# Patient Record
Sex: FEMALE | Race: WHITE | Employment: OTHER | URBAN - METROPOLITAN AREA
[De-identification: names, ages, dates, MRNs, and addresses within clinical notes are randomized per-mention and may not be internally consistent; named-entity substitution may affect disease eponyms.]

---

## 2024-02-21 ENCOUNTER — TELEMEDICINE (OUTPATIENT)
Age: 34
End: 2024-02-21
Payer: COMMERCIAL

## 2024-02-21 DIAGNOSIS — F41.1 GAD (GENERALIZED ANXIETY DISORDER): ICD-10-CM

## 2024-02-21 DIAGNOSIS — G40.919 INTRACTABLE EPILEPSY WITHOUT STATUS EPILEPTICUS, UNSPECIFIED EPILEPSY TYPE (HCC): Primary | ICD-10-CM

## 2024-02-21 DIAGNOSIS — G43.119 INTRACTABLE MIGRAINE WITH AURA WITHOUT STATUS MIGRAINOSUS: ICD-10-CM

## 2024-02-21 PROCEDURE — G8427 DOCREV CUR MEDS BY ELIG CLIN: HCPCS | Performed by: PSYCHIATRY & NEUROLOGY

## 2024-02-21 PROCEDURE — 99214 OFFICE O/P EST MOD 30 MIN: CPT | Performed by: PSYCHIATRY & NEUROLOGY

## 2024-02-21 PROCEDURE — G8417 CALC BMI ABV UP PARAM F/U: HCPCS | Performed by: PSYCHIATRY & NEUROLOGY

## 2024-02-21 PROCEDURE — G8484 FLU IMMUNIZE NO ADMIN: HCPCS | Performed by: PSYCHIATRY & NEUROLOGY

## 2024-02-21 PROCEDURE — 4004F PT TOBACCO SCREEN RCVD TLK: CPT | Performed by: PSYCHIATRY & NEUROLOGY

## 2024-02-21 RX ORDER — DULOXETIN HYDROCHLORIDE 60 MG/1
60 CAPSULE, DELAYED RELEASE ORAL DAILY
Qty: 30 CAPSULE | Refills: 5 | Status: SHIPPED | OUTPATIENT
Start: 2024-02-21

## 2024-02-21 RX ORDER — GABAPENTIN 300 MG/1
CAPSULE ORAL
COMMUNITY
Start: 2024-02-13 | End: 2024-03-13

## 2024-02-21 RX ORDER — OXCARBAZEPINE 600 MG/1
600 TABLET ORAL DAILY
Qty: 30 TABLET | Refills: 5 | Status: SHIPPED | OUTPATIENT
Start: 2024-02-21

## 2024-02-21 NOTE — PROGRESS NOTES
Tiana Schulte MD       Genesis Pina is a 33 y.o. female presenting as a follow patient for a   Chief Complaint   Patient presents with    Follow-up    Seizures      Gaining weight  Weighs 236 lbs  Going for gastric sleeve    Tried medication to lose weight  Did not help       Epilepsy:  1yrs old- episode of passing out for several minutes. Etiology not known.   2nd episode: 2012Feb, full term , ready to deliver 3rd baby, when she bent over and fell over, hit the bed, felt tremulousness all over, eyes had rolled back, wet herself. Woke up and was nauseated as well as mildly confused. ?syncope vs seizure.   3rd episode: April 2013, happened a few minutes after intercourse.  heard a thud and found her unresponsive in rest room, were noticed to have a seizure for 2-3 mins. No tongue biting/incontinence. No aura with this episode.   4th episode: may 4th 2014.   This happened immediately after intercourse.  heard her making funny noise, and then noticed that her eyes were rolled up and stiffened up all over and started shaking for 30 seconds.   Post ictal for 5 minutes.   Had a tongue biting on right > left side, but no urinary incontinence       Seizure type: partial seizures originating in right frontal lobe with secondary generalized seizures   Etiology: cryptogenic   Date of diagnosis: may 4th 2014     Current seizure medication: oxtellar 600 mg q daily since beginning of jan 2019      keppra- caused neutropenia, higher dose caused dizziness  Trileptal- dose increase caused dizziness.   Compliance with medication: yes   Any side effects of the medication: feels tired, foggy.    Breakthrough seizures:yes.   Date of last seizure: 1/31/2021  Missed a dose on 1/30/2021 of oxtellar xr 600 mg qhs.  Did not take any aed on that day  Also drank 4 glasses of wine     oct 29th 2018  Had 3 glasses of wine on 28th .  Has been going without sleep for last 2 months.   Had a uti.     Description:  Was

## 2024-09-11 RX ORDER — OXCARBAZEPINE 600 MG/1
600 TABLET ORAL DAILY
Qty: 30 TABLET | Refills: 0 | OUTPATIENT
Start: 2024-09-11

## 2024-09-12 ENCOUNTER — TELEMEDICINE (OUTPATIENT)
Age: 34
End: 2024-09-12
Payer: COMMERCIAL

## 2024-09-12 DIAGNOSIS — G40.919 INTRACTABLE EPILEPSY WITHOUT STATUS EPILEPTICUS, UNSPECIFIED EPILEPSY TYPE (HCC): Primary | ICD-10-CM

## 2024-09-12 DIAGNOSIS — F41.1 GAD (GENERALIZED ANXIETY DISORDER): ICD-10-CM

## 2024-09-12 DIAGNOSIS — G43.119 INTRACTABLE MIGRAINE WITH AURA WITHOUT STATUS MIGRAINOSUS: ICD-10-CM

## 2024-09-12 PROCEDURE — G8417 CALC BMI ABV UP PARAM F/U: HCPCS | Performed by: PSYCHIATRY & NEUROLOGY

## 2024-09-12 PROCEDURE — G8427 DOCREV CUR MEDS BY ELIG CLIN: HCPCS | Performed by: PSYCHIATRY & NEUROLOGY

## 2024-09-12 PROCEDURE — 99214 OFFICE O/P EST MOD 30 MIN: CPT | Performed by: PSYCHIATRY & NEUROLOGY

## 2024-09-12 PROCEDURE — 4004F PT TOBACCO SCREEN RCVD TLK: CPT | Performed by: PSYCHIATRY & NEUROLOGY

## 2024-09-12 RX ORDER — METHOCARBAMOL 500 MG/1
TABLET, FILM COATED ORAL
COMMUNITY
End: 2024-09-12

## 2024-09-12 RX ORDER — URSODIOL 300 MG/1
CAPSULE ORAL
COMMUNITY
Start: 2024-04-30

## 2024-09-12 RX ORDER — DULOXETIN HYDROCHLORIDE 60 MG/1
60 CAPSULE, DELAYED RELEASE ORAL DAILY
Qty: 90 CAPSULE | Refills: 1 | Status: SHIPPED | OUTPATIENT
Start: 2024-09-12

## 2024-09-12 RX ORDER — OMEPRAZOLE 40 MG/1
40 CAPSULE, DELAYED RELEASE ORAL DAILY
COMMUNITY
Start: 2024-05-24

## 2024-09-12 RX ORDER — PREGABALIN 50 MG/1
CAPSULE ORAL
COMMUNITY
Start: 2024-06-03 | End: 2024-09-12

## 2024-09-12 RX ORDER — OXCARBAZEPINE 600 MG/1
600 TABLET ORAL DAILY
Qty: 90 TABLET | Refills: 1 | Status: SHIPPED | OUTPATIENT
Start: 2024-09-12

## 2024-10-15 ENCOUNTER — HOSPITAL ENCOUNTER (EMERGENCY)
Age: 34
Discharge: HOME | End: 2024-10-15
Attending: EMERGENCY MEDICINE
Payer: COMMERCIAL

## 2024-10-15 ENCOUNTER — APPOINTMENT (OUTPATIENT)
Dept: CT IMAGING | Age: 34
End: 2024-10-15
Payer: COMMERCIAL

## 2024-10-15 ENCOUNTER — HOSPITAL ENCOUNTER (OUTPATIENT)
Age: 34
Discharge: HOME | End: 2024-10-15
Payer: COMMERCIAL

## 2024-10-15 VITALS
OXYGEN SATURATION: 100 % | DIASTOLIC BLOOD PRESSURE: 74 MMHG | WEIGHT: 190 LBS | HEART RATE: 70 BPM | RESPIRATION RATE: 18 BRPM | BODY MASS INDEX: 35.9 KG/M2 | TEMPERATURE: 98.4 F | SYSTOLIC BLOOD PRESSURE: 128 MMHG

## 2024-10-15 DIAGNOSIS — R56.9 SEIZURE (HCC): Primary | ICD-10-CM

## 2024-10-15 DIAGNOSIS — G40.919 INTRACTABLE EPILEPSY WITHOUT STATUS EPILEPTICUS, UNSPECIFIED EPILEPSY TYPE (HCC): ICD-10-CM

## 2024-10-15 PROCEDURE — 99284 EMERGENCY DEPT VISIT MOD MDM: CPT

## 2024-10-15 PROCEDURE — 70450 CT HEAD/BRAIN W/O DYE: CPT

## 2024-10-15 PROCEDURE — 6360000002 HC RX W HCPCS

## 2024-10-15 PROCEDURE — 96374 THER/PROPH/DIAG INJ IV PUSH: CPT

## 2024-10-15 PROCEDURE — 80183 DRUG SCRN QUANT OXCARBAZEPIN: CPT

## 2024-10-15 PROCEDURE — 72125 CT NECK SPINE W/O DYE: CPT

## 2024-10-15 PROCEDURE — 36415 COLL VENOUS BLD VENIPUNCTURE: CPT

## 2024-10-15 PROCEDURE — 96375 TX/PRO/DX INJ NEW DRUG ADDON: CPT

## 2024-10-15 RX ORDER — ONDANSETRON 2 MG/ML
4 INJECTION INTRAMUSCULAR; INTRAVENOUS ONCE
Status: COMPLETED | OUTPATIENT
Start: 2024-10-15 | End: 2024-10-15

## 2024-10-15 RX ORDER — KETOROLAC TROMETHAMINE 15 MG/ML
15 INJECTION, SOLUTION INTRAMUSCULAR; INTRAVENOUS ONCE
Status: COMPLETED | OUTPATIENT
Start: 2024-10-15 | End: 2024-10-15

## 2024-10-15 RX ORDER — DIPHENHYDRAMINE HYDROCHLORIDE 50 MG/ML
25 INJECTION INTRAMUSCULAR; INTRAVENOUS ONCE
Status: DISCONTINUED | OUTPATIENT
Start: 2024-10-15 | End: 2024-10-15

## 2024-10-15 RX ADMIN — ONDANSETRON 4 MG: 2 INJECTION, SOLUTION INTRAMUSCULAR; INTRAVENOUS at 15:33

## 2024-10-15 RX ADMIN — KETOROLAC TROMETHAMINE 15 MG: 15 INJECTION, SOLUTION INTRAMUSCULAR; INTRAVENOUS at 15:37

## 2024-10-15 ASSESSMENT — LIFESTYLE VARIABLES
HOW OFTEN DO YOU HAVE A DRINK CONTAINING ALCOHOL: MONTHLY OR LESS
HOW MANY STANDARD DRINKS CONTAINING ALCOHOL DO YOU HAVE ON A TYPICAL DAY: 1 OR 2

## 2024-10-15 ASSESSMENT — PAIN SCALES - GENERAL: PAINLEVEL_OUTOF10: 7

## 2024-10-15 ASSESSMENT — PAIN DESCRIPTION - LOCATION: LOCATION: HEAD

## 2024-10-15 ASSESSMENT — PAIN DESCRIPTION - DESCRIPTORS: DESCRIPTORS: ACHING;HEAVINESS

## 2024-10-15 NOTE — DISCHARGE INSTRUCTIONS
Please return to the ER for any new or worsening symptoms including but not limited to Fever or Chest pain or difficulty breathing  If prescribed, please be sure to  your prescriptions from the pharmacy  Please follow-up with Primary care provider and neurology as instructed

## 2024-10-15 NOTE — ED PROVIDER NOTES
[facility] EMERGENCY DEPARTMENT  EMERGENCY DEPARTMENT ENCOUNTER        Pt Name: Genesis Pina  MRN: [id number]  Birthdate 1990  Date of evaluation: 10/15/2024  Provider: Lulu Guajardo DO  PCP: Joselito Stanton DO  Note Started: 2:12 PM EDT 10/15/24    CHIEF COMPLAINT       Chief Complaint   Patient presents with    Seizures     Pt complaint of seizure with hx of seizures. Pt out of seizure medications for 2 days.       HISTORY OF PRESENT ILLNESS: 1 or more Elements   History From: Patient    Limitations to history : None  Social Determinants : None    Genesis Pina is a 34 y.o. female who presents for seizure. She states she had a seizure this morning around 11:30. She has a hx of seizures and did not have her medication for the past 2 days. She states the pharmacy was out of the medication until today. She states she has a hx of grand mal seizures and has not had a seizure since she missed a dose of this medicine a few months ago. She did hit her head when she fell and seized today. She states she has a headache and some nausea which is normal for her after a seizure.  Denies any fever, chills, dizziness, vision changes, neck tenderness or stiffness, weakness, cp, palpitations, leg swelling/tenderness, sob, cough, abd pain, dysuria, hematuria, diarrhea, constipation, bloody stools.    Nursing Notes were all reviewed and agreed with or any disagreements were addressed in the HPI.    ROS:   Pertinent positives and negatives are stated within HPI, all other systems reviewed and are negative.      --------------------------------------------- PAST HISTORY ---------------------------------------------  Past Medical History:  has a past medical history of Epilepsy (HCC) and Seizures (HCC).    Past Surgical History:  has a past surgical history that includes  section; Tubal ligation; and Hysterectomy.    Social History:  reports that she has quit smoking. Her  16   Wt 86.2 kg (190 lb)   LMP 11/17/2016   SpO2 100%   BMI 35.90 kg/m²   While in the ED patient was hemodynamically stable, afebrile, nontoxic-appearing, in no respiratory distress.   Physical exam remarkable for normal physical exam, no neuro deficits  Ddx: seizure, intracranial hemorrhage, skull fracture, cervical spine fracture   CT head showed no acute intracranial abnormality  CT cervical spine showed no acute abnormality    Patient administered Toradol and Zofran.  Patient's headache improved on reevaluation.  The fact that we did not have the patient's carbamazepine extended release in our pharmacy, and patient said that her pharmacy had it ready, be discharged to  and take her medication tonight.  Due to patient's reassuring physical exam including no CT imagings, patiently discharged home for outpatient management and follow-up. She is educated on signs and symptoms that require emergent evaluation. Pt is advised to return to the ED if her symptoms change or worsen. If her pain persists, pt may need further evaluation. Pt is agreeable to plan and all questions have been answered at this time.      Please see ED course for more details:         Medications   ketorolac (TORADOL) injection 15 mg (15 mg IntraVENous Given 10/15/24 1537)   ondansetron (ZOFRAN) injection 4 mg (4 mg IntraVENous Given 10/15/24 1533)       Discharge Medication List as of 10/15/2024  3:49 PM            Counseling:   The emergency provider has spoken with the patient and discussed today’s results, in addition to providing specific details for the plan of care and counseling regarding the diagnosis and prognosis.  Questions are answered at this time and they are agreeable with the plan.       --------------------------------- IMPRESSION AND DISPOSITION ---------------------------------    IMPRESSION  1. Seizure (HCC)        DISPOSITION  Disposition: Discharge to home  Patient condition is stable        NOTE: This report was

## 2024-10-16 ENCOUNTER — TELEMEDICINE (OUTPATIENT)
Age: 34
End: 2024-10-16
Payer: COMMERCIAL

## 2024-10-16 DIAGNOSIS — G43.901 STATUS MIGRAINOSUS: ICD-10-CM

## 2024-10-16 DIAGNOSIS — G43.119 INTRACTABLE MIGRAINE WITH AURA WITHOUT STATUS MIGRAINOSUS: ICD-10-CM

## 2024-10-16 DIAGNOSIS — G40.919 INTRACTABLE EPILEPSY WITHOUT STATUS EPILEPTICUS, UNSPECIFIED EPILEPSY TYPE (HCC): Primary | ICD-10-CM

## 2024-10-16 DIAGNOSIS — F41.1 GAD (GENERALIZED ANXIETY DISORDER): ICD-10-CM

## 2024-10-16 PROCEDURE — 99214 OFFICE O/P EST MOD 30 MIN: CPT | Performed by: PSYCHIATRY & NEUROLOGY

## 2024-10-16 PROCEDURE — G8427 DOCREV CUR MEDS BY ELIG CLIN: HCPCS | Performed by: PSYCHIATRY & NEUROLOGY

## 2024-10-16 PROCEDURE — 4004F PT TOBACCO SCREEN RCVD TLK: CPT | Performed by: PSYCHIATRY & NEUROLOGY

## 2024-10-16 PROCEDURE — G8484 FLU IMMUNIZE NO ADMIN: HCPCS | Performed by: PSYCHIATRY & NEUROLOGY

## 2024-10-16 PROCEDURE — G8417 CALC BMI ABV UP PARAM F/U: HCPCS | Performed by: PSYCHIATRY & NEUROLOGY

## 2024-10-16 RX ORDER — METRONIDAZOLE 500 MG/1
TABLET ORAL
COMMUNITY
Start: 2024-09-19

## 2024-10-16 RX ORDER — MELOXICAM 15 MG/1
15 TABLET ORAL DAILY
COMMUNITY
Start: 2024-09-26

## 2024-10-16 RX ORDER — OXCARBAZEPINE 300 MG/1
300 TABLET, FILM COATED ORAL 2 TIMES DAILY PRN
Qty: 28 TABLET | Refills: 0 | Status: SHIPPED | OUTPATIENT
Start: 2024-10-16 | End: 2024-10-30

## 2024-10-16 RX ORDER — METHYLPREDNISOLONE 4 MG
TABLET, DOSE PACK ORAL
Qty: 22 TABLET | Refills: 0 | Status: SHIPPED | OUTPATIENT
Start: 2024-10-16 | End: 2024-10-22

## 2024-10-16 NOTE — PROGRESS NOTES
time      2. Status migrainosus  Has a status migrainosus post COVID and after a breakthrough seizure  Advised her to have a Medrol Dosepak  Continue on Cymbalta 60 mg daily    3. Intractable migraine with aura without status migrainosus  Her headaches have been controlled on Cymbalta 60 mg daily  But recent COVID infection and a possible breakthrough seizure echo is causing the status migrainosus since yesterday  Has been using NSAIDs over-the-counter  Advised her to use Medrol Dosepak      4. PARI (generalized anxiety disorder)  On Cymbalta        Tiana Schulte, MD Genesis Pina, was evaluated through a synchronous (real-time) audio-video encounter. The patient (and/or guardian if applicable) is aware that this is a billable service, which includes applicable co-pays. This virtual visit was conducted with patient's (and/or legal guardian's) consent. Patient identification was verified, and a caregiver was present when appropriate.  The patient was located at Home: [address] [place] [address]  The provider was located at Home (City/State): [place]  Yes, I confirm.   Consults     Total time spent on this encounter: Not billed by time    An electronic signature was used to authenticate this note.

## 2024-10-19 LAB — 10OH-CARBAZEPINE SERPL-MCNC: <1 UG/ML (ref 3–35)

## 2025-02-25 RX ORDER — OXCARBAZEPINE 600 MG/1
600 TABLET, EXTENDED RELEASE ORAL DAILY
Qty: 90 TABLET | Refills: 0 | Status: SHIPPED | OUTPATIENT
Start: 2025-02-25

## 2025-02-25 NOTE — TELEPHONE ENCOUNTER
The following medication has been approved and sent to your pharmacy    Requested Prescriptions     Signed Prescriptions Disp Refills    OXcarbazepine ER (OXTELLAR XR) 600 MG TB24 90 tablet 0     Sig: Take 600 mg by mouth daily     Authorizing Provider: CARMELLA MARS

## 2025-03-17 ENCOUNTER — OFFICE VISIT (OUTPATIENT)
Age: 35
End: 2025-03-17
Payer: COMMERCIAL

## 2025-03-17 VITALS
DIASTOLIC BLOOD PRESSURE: 77 MMHG | HEIGHT: 61 IN | SYSTOLIC BLOOD PRESSURE: 114 MMHG | HEART RATE: 77 BPM | BODY MASS INDEX: 32 KG/M2 | WEIGHT: 169.5 LBS

## 2025-03-17 DIAGNOSIS — G43.119 INTRACTABLE MIGRAINE WITH AURA WITHOUT STATUS MIGRAINOSUS: ICD-10-CM

## 2025-03-17 DIAGNOSIS — R53.82 CHRONIC FATIGUE: ICD-10-CM

## 2025-03-17 DIAGNOSIS — G40.919 INTRACTABLE EPILEPSY WITHOUT STATUS EPILEPTICUS, UNSPECIFIED EPILEPSY TYPE (HCC): Primary | ICD-10-CM

## 2025-03-17 DIAGNOSIS — Z98.84 HISTORY OF BARIATRIC SURGERY: ICD-10-CM

## 2025-03-17 DIAGNOSIS — F41.1 GAD (GENERALIZED ANXIETY DISORDER): ICD-10-CM

## 2025-03-17 LAB
FOLATE: >20 NG/ML (ref 4.8–24.2)
VITAMIN B-12: 1229 PG/ML (ref 211–946)

## 2025-03-17 PROCEDURE — 99214 OFFICE O/P EST MOD 30 MIN: CPT | Performed by: PSYCHIATRY & NEUROLOGY

## 2025-03-17 PROCEDURE — G8417 CALC BMI ABV UP PARAM F/U: HCPCS | Performed by: PSYCHIATRY & NEUROLOGY

## 2025-03-17 PROCEDURE — G8427 DOCREV CUR MEDS BY ELIG CLIN: HCPCS | Performed by: PSYCHIATRY & NEUROLOGY

## 2025-03-17 PROCEDURE — 4004F PT TOBACCO SCREEN RCVD TLK: CPT | Performed by: PSYCHIATRY & NEUROLOGY

## 2025-03-17 RX ORDER — DULOXETIN HYDROCHLORIDE 30 MG/1
30 CAPSULE, DELAYED RELEASE ORAL DAILY
Qty: 90 CAPSULE | Refills: 1 | Status: SHIPPED | OUTPATIENT
Start: 2025-03-17

## 2025-03-17 RX ORDER — OXCARBAZEPINE 600 MG/1
600 TABLET, EXTENDED RELEASE ORAL DAILY
Qty: 90 TABLET | Refills: 1 | Status: SHIPPED | OUTPATIENT
Start: 2025-03-17

## 2025-03-17 RX ORDER — DULOXETIN HYDROCHLORIDE 60 MG/1
60 CAPSULE, DELAYED RELEASE ORAL DAILY
Qty: 90 CAPSULE | Refills: 1 | Status: SHIPPED | OUTPATIENT
Start: 2025-03-17

## 2025-03-17 RX ORDER — HYDROXYZINE PAMOATE 25 MG/1
25 CAPSULE ORAL 3 TIMES DAILY PRN
Qty: 90 CAPSULE | Refills: 5 | Status: SHIPPED | OUTPATIENT
Start: 2025-03-17

## 2025-03-17 NOTE — PROGRESS NOTES
lateral border of hand. . No extinction on double simultaneous stimulation..   Therewas no extinction on the bilateral with bilateral simultaneous stimulus.   The gait examination was normal including tandem gait.   alexander hallpike's neg   Skin: Skin is warm. She is not diaphoretic.   Psychiatric: Memory, affect and judgment normal.      Iron levels ok  Ferritin nml  Tsh nml  Cbc: low wbc count  Cmp: nml  Lipid panel: ldl 180        ASSESSMENT AND PLAN   1. Intractable epilepsy without status epilepticus, unspecified epilepsy type (HCC)  Patient seizures have been controlled on Oxtellar 600 mg daily   Continue Oxtellar 600 mg daily  She does have a short supply of generic Trileptal just in case her pharmacy runs out of Oxtellar like they did in October 2024    - OXcarbazepine ER (OXTELLAR XR) 600 MG TB24; Take 600 mg by mouth daily  Dispense: 90 tablet; Refill: 1    2. Intractable migraine with aura without status migrainosus  Headaches have been well-controlled on Cymbalta 60 mg daily    3. PARI (generalized anxiety disorder)  Anxiety has been a little bit more pronounced  Advised her to increase her Cymbalta to 90 mg daily  BuSpar did not help  Help suggest Vistaril as needed    4. Chronic fatigue  Had bariatric surgery  Having weight loss  But complains of fatigue  On bariatric once a day vitamin  Her iron levels and ferritin levels were normal  Recommend checking B12, folic acid B1 and copper level    - Vitamin B12 & Folate; Future  - Vitamin B1; Future  - Copper, Serum; Future    5. History of bariatric surgery  Advised her to be on extra B12, copper as well as iron supplement          Tiana Schulte MD

## 2025-03-17 NOTE — PATIENT INSTRUCTIONS
Take b12- 500 to 1000 mcg daily    Copper 2 mg daily      Iron elemental iron take 65 mg daily      Tiana Schulte MD

## 2025-03-20 LAB — COPPER: 88.1 UG/DL (ref 80–155)

## 2025-03-23 LAB — VITAMIN B1 WHOLE BLOOD: 175 NMOL/L (ref 70–180)

## 2025-04-04 ENCOUNTER — RESULTS FOLLOW-UP (OUTPATIENT)
Age: 35
End: 2025-04-04

## 2025-06-02 ENCOUNTER — HOSPITAL ENCOUNTER (EMERGENCY)
Facility: HOSPITAL | Age: 35
Discharge: HOME | End: 2025-06-02
Attending: EMERGENCY MEDICINE
Payer: COMMERCIAL

## 2025-06-02 VITALS
BODY MASS INDEX: 31.72 KG/M2 | HEART RATE: 94 BPM | TEMPERATURE: 97.3 F | WEIGHT: 168 LBS | OXYGEN SATURATION: 100 % | DIASTOLIC BLOOD PRESSURE: 95 MMHG | RESPIRATION RATE: 16 BRPM | HEIGHT: 61 IN | SYSTOLIC BLOOD PRESSURE: 135 MMHG

## 2025-06-02 DIAGNOSIS — R21 RASH AND OTHER NONSPECIFIC SKIN ERUPTION: Primary | ICD-10-CM

## 2025-06-02 PROCEDURE — 99283 EMERGENCY DEPT VISIT LOW MDM: CPT | Performed by: EMERGENCY MEDICINE

## 2025-06-02 RX ORDER — HYDROXYZINE HYDROCHLORIDE 25 MG/1
25 TABLET, FILM COATED ORAL EVERY 6 HOURS
Qty: 12 TABLET | Refills: 0 | Status: SHIPPED | OUTPATIENT
Start: 2025-06-02 | End: 2025-06-05

## 2025-06-02 ASSESSMENT — COLUMBIA-SUICIDE SEVERITY RATING SCALE - C-SSRS
6. HAVE YOU EVER DONE ANYTHING, STARTED TO DO ANYTHING, OR PREPARED TO DO ANYTHING TO END YOUR LIFE?: NO
2. HAVE YOU ACTUALLY HAD ANY THOUGHTS OF KILLING YOURSELF?: NO
1. IN THE PAST MONTH, HAVE YOU WISHED YOU WERE DEAD OR WISHED YOU COULD GO TO SLEEP AND NOT WAKE UP?: NO

## 2025-06-02 ASSESSMENT — PAIN DESCRIPTION - DESCRIPTORS: DESCRIPTORS: ACHING

## 2025-06-02 ASSESSMENT — PAIN - FUNCTIONAL ASSESSMENT
PAIN_FUNCTIONAL_ASSESSMENT: 0-10
PAIN_FUNCTIONAL_ASSESSMENT: 0-10

## 2025-06-02 ASSESSMENT — PAIN DESCRIPTION - LOCATION: LOCATION: EYE

## 2025-06-02 ASSESSMENT — PAIN SCALES - GENERAL
PAINLEVEL_OUTOF10: 3
PAINLEVEL_OUTOF10: 0 - NO PAIN

## 2025-06-02 ASSESSMENT — PAIN DESCRIPTION - PAIN TYPE: TYPE: ACUTE PAIN

## 2025-06-03 NOTE — ED PROVIDER NOTES
[facility]  ED  Provider Note  No admission date for patient encounter.  Room/bed info not found              No chief complaint on file.        History of Present Illness:   Ashia Juares is a 34 y.o. female presenting to the ED for rash, beginning more than 10 days ago.  The complaint has been persistent, moderate in severity, and worsened by itching patient has a rash that started in the back of her left knee throughout the left thigh with some spots on her buttock.  Left arm and now on her left face.  She has been treated for lice with Nix lotion, high-dose prednisone for possible poison ivy, and acyclovir for possible shingles.  She continues to be symptomatic.  She denies any known exposures to poison ivy poison oak or other skin irritants.  She denies any change in laundry care products skin care products soaps or shampoos.  She is not short of breath.  She has no cough.      Review of Systems:   Pertinent positives and review of systems as noted above.  Remaining 10 review of systems is negative or noncontributory to today's episode of care.  Review of Systems       --------------------------------------------- PAST HISTORY ---------------------------------------------  Medical History[1]      has no past surgical history on file.         family history is not on file. Unless otherwise noted, family history is non contributory    Patient's Medications    No medications on file      The patient’s home medications have been reviewed.    Allergies: Iodinated contrast media    -------------------------------------------------- RESULTS -------------------------------------------------  All laboratory and radiology results have been personally reviewed by myself   LABS:  Labs Reviewed - No data to display    EKG:     RADIOLOGY:  Interpreted by Radiologist.  No orders to display       ------------------------- NURSING NOTES AND VITALS REVIEWED ---------------------------   The nursing notes  within the ED encounter and vital signs as below have been reviewed.   There were no vitals taken for this visit.  Oxygen Saturation Interpretation: Normal      ---------------------------------------------------PHYSICAL EXAM--------------------------------------  Physical Exam   Constitutional/General: Alert and oriented x3, well appearing, non toxic in NAD  Head: Normocephalic and atraumatic  Eyes: PERRL, EOMI, conjunctiva normal, sclera non icteric  Mouth: Oropharynx clear, handling secretions, no trismus, no asymmetry of the posterior oropharynx or uvular edema  Neck: Supple, full ROM, non tender to palpation in the midline, no stridor, no crepitus, no meningeal signs  Respiratory: Lungs clear to auscultation bilaterally, no wheezes, rales, or rhonchi  Cardiovascular:  Regular rate. Regular rhythm. No murmurs, gallops, or rubs. 2+ distal pulses  Chest: No chest wall tenderness  GI:  Abdomen Soft, Non tender, Non distended.  +BS. No organomegaly, no palpable masses,  No rebound, guarding, or rigidity. No CVAT   Musculoskeletal: Moves all extremities x 4. Warm and well perfused, no clubbing, cyanosis, or edema. Capillary refill <3 seconds  Integument: skin warm and dry.  Scaling rash to the back of the left knee.  Small intradermal lesions and track like pattern in the left posterior upper thigh.  Small urticarial type lesions on the left forearm.  1 hive below the left eye.  Lymphatic: no lymphadenopathy noted  Neurologic:  No focal deficits, symmetric strength 5/5 in the upper and lower extremities bilaterally  Psychiatric: Normal Affect    Procedures    Diagnoses as of 06/02/25 2143   Rash and other nonspecific skin eruption          Medical Decision Making:   Patient was referred to [facility] and the allergist for further care and treatment.      Counseling:   The emergency provider has spoken with the patient and discussed today’s results, in addition to providing specific details for the plan of care and  counseling regarding the diagnosis and prognosis.  Questions are answered at this time and they are agreeable with the plan.      --------------------------------- IMPRESSION AND DISPOSITION ---------------------------------        IMPRESSION  1. Rash and other nonspecific skin eruption        DISPOSITION  Disposition: Discharge to home  Patient condition is fair      Billing Provider Critical Care Time: 0 minutes       [1] No past medical history on file.       Juan Jose Yeh MD  06/02/25 1095

## 2025-06-03 NOTE — ED TRIAGE NOTES
Pt was recently diagnosed with shingles on her abdomen and left arm and left leg, she now has a small area near her eye. Pt is on medications for shingles.

## 2025-06-03 NOTE — ED NOTES
Patient given discharge instructions and verbalizes understanding. Pt aware of prescriptions sent to pharmacy. Pt left ED ambulatory with all belongings.     Olga Lidia Seymour RN  06/02/25 0462

## 2025-06-03 NOTE — DISCHARGE INSTRUCTIONS
Atarax as prescribed.    Call [facility] at [phone] for outpatient follow-up this week.    Turn for worsening symptoms or concerns